# Patient Record
Sex: MALE | Race: BLACK OR AFRICAN AMERICAN | Employment: FULL TIME | URBAN - METROPOLITAN AREA
[De-identification: names, ages, dates, MRNs, and addresses within clinical notes are randomized per-mention and may not be internally consistent; named-entity substitution may affect disease eponyms.]

---

## 2019-09-04 ENCOUNTER — APPOINTMENT (OUTPATIENT)
Dept: GENERAL RADIOLOGY | Age: 34
End: 2019-09-04
Attending: EMERGENCY MEDICINE
Payer: COMMERCIAL

## 2019-09-04 ENCOUNTER — HOSPITAL ENCOUNTER (EMERGENCY)
Age: 34
Discharge: HOME | End: 2019-09-04
Attending: EMERGENCY MEDICINE
Payer: COMMERCIAL

## 2019-09-04 VITALS
WEIGHT: 240 LBS | SYSTOLIC BLOOD PRESSURE: 136 MMHG | BODY MASS INDEX: 30.8 KG/M2 | TEMPERATURE: 97.4 F | DIASTOLIC BLOOD PRESSURE: 63 MMHG | OXYGEN SATURATION: 100 % | HEIGHT: 74 IN | HEART RATE: 94 BPM | RESPIRATION RATE: 16 BRPM

## 2019-09-04 DIAGNOSIS — S96.912A STRAIN OF LEFT FOOT, INITIAL ENCOUNTER: Primary | ICD-10-CM

## 2019-09-04 PROCEDURE — 73630 X-RAY EXAM OF FOOT: CPT

## 2019-09-04 PROCEDURE — 77030036687 HC SHOE PSTOP S2SG -A

## 2019-09-04 PROCEDURE — 99283 EMERGENCY DEPT VISIT LOW MDM: CPT

## 2019-09-04 RX ORDER — IBUPROFEN 800 MG/1
800 TABLET ORAL
Qty: 20 TAB | Refills: 0 | Status: SHIPPED | OUTPATIENT
Start: 2019-09-04 | End: 2019-09-11

## 2019-09-04 NOTE — ED PROVIDER NOTES
EMERGENCY DEPARTMENT HISTORY AND PHYSICAL EXAM    Date: 9/4/2019  Patient Name: Amauri Dutton    History of Presenting Illness     Chief Complaint   Patient presents with    Foot Pain       History Provided By: Patient    Chief Complaint: left foot       Additional History (Context):   5:01 PM  Amauri Dutton is a 34 y.o. male presents to the emergency department C/O left foot pain after he rolled his foot when he tripped over a pallet earlier today. He has been able to ambulate with pain. He has not taken anything for pain. He has no history of injury to the area. No tingling or numbness. PCP: None        Past History     Past Medical History:  History reviewed. No pertinent past medical history. Past Surgical History:  History reviewed. No pertinent surgical history. Family History:  History reviewed. No pertinent family history. Social History:  Social History     Tobacco Use    Smoking status: Never Smoker    Smokeless tobacco: Never Used   Substance Use Topics    Alcohol use: Yes     Alcohol/week: 1.0 standard drinks     Types: 1 Shots of liquor per week    Drug use: Not on file       Allergies:  No Known Allergies    Review of Systems   Review of Systems   Constitutional: Negative for chills and fever. Respiratory: Negative for shortness of breath. Cardiovascular: Negative for chest pain. Musculoskeletal: Positive for arthralgias (left foot ). Skin: Negative for wound. Neurological: Negative for weakness and numbness. All other systems reviewed and are negative. Physical Exam     Vitals:    09/04/19 1646   BP: 136/63   Pulse: 94   Resp: 16   Temp: 97.4 °F (36.3 °C)   SpO2: 100%   Weight: 108.9 kg (240 lb)   Height: 6' 2\" (1.88 m)     Physical Exam   Constitutional: He is oriented to person, place, and time. He appears well-developed and well-nourished. HENT:   Head: Normocephalic and atraumatic.    Cardiovascular: Normal rate, regular rhythm, normal heart sounds and intact distal pulses. No murmur heard. Pulmonary/Chest: Effort normal and breath sounds normal. No respiratory distress. He has no wheezes. He has no rales. Musculoskeletal:        Left ankle: Tenderness. Head of 5th metatarsal tenderness found. No proximal fibula tenderness found. Left foot: There is tenderness. There is normal range of motion, no swelling, normal capillary refill, no crepitus and no deformity. Feet:    Neurological: He is alert and oriented to person, place, and time. Skin: Skin is warm and dry. Psychiatric: He has a normal mood and affect. Judgment normal.   Nursing note and vitals reviewed. Diagnostic Study Results     Labs:   No results found for this or any previous visit (from the past 12 hour(s)). Radiologic Studies:   XR FOOT LT MIN 3 V    (Results Pending)     CT Results  (Last 48 hours)    None        CXR Results  (Last 48 hours)    None          5:18 PM  RADIOLOGY FINDINGS  Left foot X-ray shows NAP  Pending review by Radiologist  Recorded by Clearence Kanner, PA-C    Medical Decision Making   I am the first provider for this patient. I reviewed the vital signs, available nursing notes, past medical history, past surgical history, family history and social history. Vital Signs: Reviewed the patient's vital signs. Pulse Oximetry Analysis: 100% on RA       Records Reviewed: Nursing Notes and Old Medical Records    Procedures:  Procedures    ED Course:   5:01 PM Initial assessment performed. The patients presenting problems have been discussed, and they are in agreement with the care plan formulated and outlined with them. I have encouraged them to ask questions as they arise throughout their visit. Discussion:  Pt presents with left foot pain after injury today. He is neurovascularly intact. X-ray shows no acute process. Will place in postop shoe and give crutches and ibuprofen have patient follow-up with Ortho if not better in a week.  Strict return precautions given, pt offering no questions or complaints. Diagnosis and Disposition     DISCHARGE NOTE:  Rosana Mandujano's  results have been reviewed with him. He has been counseled regarding his diagnosis, treatment, and plan. He verbally conveys understanding and agreement of the signs, symptoms, diagnosis, treatment and prognosis and additionally agrees to follow up as discussed. He also agrees with the care-plan and conveys that all of his questions have been answered. I have also provided discharge instructions for him that include: educational information regarding their diagnosis and treatment, and list of reasons why they would want to return to the ED prior to their follow-up appointment, should his condition change. He has been provided with education for proper emergency department utilization. CLINICAL IMPRESSION:    1. Strain of left foot, initial encounter        PLAN:  1. D/C Home  2. Current Discharge Medication List      START taking these medications    Details   ibuprofen (MOTRIN) 800 mg tablet Take 1 Tab by mouth every six (6) hours as needed for Pain for up to 7 days. Qty: 20 Tab, Refills: 0           3. Follow-up Information     Follow up With Specialties Details Why Contact Info    Larry Hubbard MD Orthopedic Surgery  call for follow up if not better in a week  [address]      THE [facility] EMERGENCY DEPT Emergency Medicine  If symptoms worsen [address] [address] [address] [phone]             Please note that this dictation was completed with Reading Rainbow, the computer voice recognition software. Quite often unanticipated grammatical, syntax, homophones, and other interpretive errors are inadvertently transcribed by the computer software. Please disregard these errors. Please excuse any errors that have escaped final proofreading.

## 2019-09-04 NOTE — LETTER
[facility] [place] 
[facility] 
[address] [address] [phone] Work/School Note Date: 9/4/2019 To Whom It May concern: 
 
Mima Tomas was seen and treated today in the emergency room by the following provider(s): 
Attending Provider: Annmarie Romano DO Physician Assistant: MELE Fernandez. Mima Tomas may return to work on 9/5/2019, limited use of left leg for 1 week allow crutches for weightbearing. Rayo Singer Sincerely, Kym Shone, PA

## 2019-09-11 ENCOUNTER — HOSPITAL ENCOUNTER (EMERGENCY)
Age: 34
Discharge: HOME | End: 2019-09-11
Attending: EMERGENCY MEDICINE
Payer: COMMERCIAL

## 2019-09-11 VITALS
OXYGEN SATURATION: 99 % | WEIGHT: 240 LBS | HEART RATE: 83 BPM | RESPIRATION RATE: 18 BRPM | BODY MASS INDEX: 30.8 KG/M2 | TEMPERATURE: 99.5 F | DIASTOLIC BLOOD PRESSURE: 83 MMHG | HEIGHT: 74 IN | SYSTOLIC BLOOD PRESSURE: 153 MMHG

## 2019-09-11 DIAGNOSIS — Z76.89 RETURN TO WORK EVALUATION: Primary | ICD-10-CM

## 2019-09-11 PROCEDURE — 99282 EMERGENCY DEPT VISIT SF MDM: CPT

## 2019-09-11 NOTE — ED TRIAGE NOTES
Patient ambulate to ED with left ankle pain, patient injured ankle last week and still having pain, patient states he was referred to orthopedic, but did not want to make appointment, patient is here to be cleared to return to work and \"recheck my ankle to make sure it is still in a good position\"

## 2019-09-11 NOTE — LETTER
[facility] [place] 
[facility] 
[address] [address] [phone] Work/School Note Date: 9/11/2019 To Whom It May concern: 
 
Aurelia Mccoy was seen and treated today in the emergency room by the following provider(s): 
Attending Provider: Mckenzie Mae DO Physician Assistant: MELE Irvin. Aurelia Mccoy may return to work on 9/12/19 restrictions, if pain recurs follow-up with Ortho. Sincerely, MELE Villegas

## 2019-09-11 NOTE — ED PROVIDER NOTES
EMERGENCY DEPARTMENT HISTORY AND PHYSICAL EXAM    Date: 9/11/2019  Patient Name: Molina Acuña    History of Presenting Illness     Chief Complaint   Patient presents with    Ankle Pain         History Provided By: Patient    Chief Complaint: left foot pain       Additional History (Context):   6:53 PM  Molina Acuña is a 34 y.o. male presents to the emergency department C/O foot pain that occurred after he rolled his ankle on 9/4. He was seen in the ED and had a normal x-ray. He has been following recommendations with rest ice and elevation and states his foot feels better. He is still having some pain but states he is able to walk on it. He presents today requesting note to return to work. PCP: None    Current Outpatient Medications   Medication Sig Dispense Refill    ibuprofen (MOTRIN) 800 mg tablet Take 1 Tab by mouth every six (6) hours as needed for Pain for up to 7 days. 20 Tab 0       Past History     Past Medical History:  History reviewed. No pertinent past medical history. Past Surgical History:  History reviewed. No pertinent surgical history. Family History:  History reviewed. No pertinent family history. Social History:  Social History     Tobacco Use    Smoking status: Never Smoker    Smokeless tobacco: Never Used   Substance Use Topics    Alcohol use: Yes     Alcohol/week: 1.0 standard drinks     Types: 1 Shots of liquor per week    Drug use: Not on file       Allergies:  No Known Allergies    Review of Systems   Review of Systems   Constitutional: Negative for chills and fever. Respiratory: Negative for shortness of breath. Cardiovascular: Negative for chest pain. Musculoskeletal: Positive for arthralgias (left foot). Neurological: Negative for weakness and numbness. All other systems reviewed and are negative.       Physical Exam     Vitals:    09/11/19 1839   BP: 153/83   Pulse: 83   Resp: 18   Temp: 99.5 °F (37.5 °C)   SpO2: 99%   Weight: 108.9 kg (240 lb)   Height: 6' 2\" (1.88 m)     Physical Exam   Constitutional: He is oriented to person, place, and time. He appears well-developed and well-nourished. Well-appearing, nontoxic distress, ambulatory without difficulty   HENT:   Head: Normocephalic and atraumatic. Cardiovascular: Normal rate, regular rhythm, normal heart sounds and intact distal pulses. No murmur heard. Pulmonary/Chest: Effort normal and breath sounds normal. No respiratory distress. He has no wheezes. He has no rales. Musculoskeletal:        Left foot: There is tenderness. Feet:    Neurological: He is alert and oriented to person, place, and time. Skin: Skin is warm and dry. Psychiatric: He has a normal mood and affect. Judgment normal.   Nursing note and vitals reviewed. Diagnostic Study Results     Labs:   No results found for this or any previous visit (from the past 12 hour(s)). Radiologic Studies:   No orders to display     CT Results  (Last 48 hours)    None        CXR Results  (Last 48 hours)    None          Medical Decision Making   I am the first provider for this patient. I reviewed the vital signs, available nursing notes, past medical history, past surgical history, family history and social history. Vital Signs: Reviewed the patient's vital signs. Pulse Oximetry Analysis: 99% on RA       Records Reviewed: Nursing Notes and Old Medical Records    Procedures:  Procedures    ED Course:   6:53 PM Initial assessment performed. The patients presenting problems have been discussed, and they are in agreement with the care plan formulated and outlined with them. I have encouraged them to ask questions as they arise throughout their visit. Discussion:  Pt presents with left foot pain that occurred 9/4 after he rolled his ankle. X-ray was normal at that time. He has been following instructions and states his foot has improved ever he is still having some pain. Requesting note to return to work.   No additional injury and he has a normal exam. Strict return precautions given, pt offering no questions or complaints. Diagnosis and Disposition     DISCHARGE NOTE:  Rosana Mandujano's  results have been reviewed with him. He has been counseled regarding his diagnosis, treatment, and plan. He verbally conveys understanding and agreement of the signs, symptoms, diagnosis, treatment and prognosis and additionally agrees to follow up as discussed. He also agrees with the care-plan and conveys that all of his questions have been answered. I have also provided discharge instructions for him that include: educational information regarding their diagnosis and treatment, and list of reasons why they would want to return to the ED prior to their follow-up appointment, should his condition change. He has been provided with education for proper emergency department utilization. CLINICAL IMPRESSION:    1. Return to work evaluation        PLAN:  1. D/C Home  2. Current Discharge Medication List        3. Follow-up Information     Follow up With Specialties Details Why Contact Info    Sheryle Costain, MD Orthopedic Surgery  call for follow up and recheck  [place] [address] [address] [place] [address]      THE [facility] EMERGENCY DEPT Emergency Medicine  If symptoms worsen [address] [place] [id number] [phone]                 Please note that this dictation was completed with Waynaut, the computer voice recognition software. Quite often unanticipated grammatical, syntax, homophones, and other interpretive errors are inadvertently transcribed by the computer software. Please disregard these errors. Please excuse any errors that have escaped final proofreading.

## 2019-09-11 NOTE — LETTER
[facility] [place] 
THE [facility] EMERGENCY DEPT 
[address] [address] [phone] Work/School Note Date: 9/11/2019 To Whom It May concern: 
 
Nitza Mary was seen and treated today in the emergency room by the following provider(s): 
Physician Assistant: Charles Santos. Nitza Mary may return to work on 9/12/2019, no restrictions. If pain reoccurs, follow up with orthopedic. Sincerely, Authur Ahumada, PA

## 2020-03-10 ENCOUNTER — HOSPITAL ENCOUNTER (EMERGENCY)
Age: 35
Discharge: HOME | End: 2020-03-11
Attending: EMERGENCY MEDICINE
Payer: COMMERCIAL

## 2020-03-10 VITALS
OXYGEN SATURATION: 100 % | HEART RATE: 79 BPM | HEIGHT: 73 IN | TEMPERATURE: 97.4 F | BODY MASS INDEX: 33.13 KG/M2 | WEIGHT: 250 LBS | RESPIRATION RATE: 16 BRPM | DIASTOLIC BLOOD PRESSURE: 64 MMHG | SYSTOLIC BLOOD PRESSURE: 123 MMHG

## 2020-03-10 DIAGNOSIS — R68.89 FLU-LIKE SYMPTOMS: Primary | ICD-10-CM

## 2020-03-10 DIAGNOSIS — Z72.0 TOBACCO USE: ICD-10-CM

## 2020-03-10 LAB
FLUAV AG NPH QL IA: NEGATIVE
FLUBV AG NOSE QL IA: NEGATIVE

## 2020-03-10 PROCEDURE — 87804 INFLUENZA ASSAY W/OPTIC: CPT

## 2020-03-10 PROCEDURE — 99281 EMR DPT VST MAYX REQ PHY/QHP: CPT

## 2020-03-10 NOTE — LETTER
NOTIFICATION RETURN TO WORK / SCHOOL 
 
3/11/2020 12:53 AM 
 
Mr. Kinza Jose [address] [facility] [address] To Whom It May Concern: 
 
Kinza Jose is currently under the care of THE [facility] EMERGENCY DEPT. He will return to work/school once WITHOUT fever of flu like symptoms If there are questions or concerns please have the patient contact our office.  
 
 
 
Sincerely, 
 
 
 
 
Bryan Harvey MD

## 2020-03-11 RX ORDER — CEPHALEXIN 250 MG/1
250 CAPSULE ORAL 4 TIMES DAILY
Qty: 28 CAP | Refills: 0 | Status: SHIPPED | OUTPATIENT
Start: 2020-03-11 | End: 2020-03-18

## 2020-03-11 RX ORDER — IBUPROFEN 600 MG/1
600 TABLET ORAL
Qty: 20 TAB | Refills: 0 | Status: SHIPPED | OUTPATIENT
Start: 2020-03-11

## 2020-03-11 RX ORDER — ACETAMINOPHEN 325 MG/1
650 TABLET ORAL
Qty: 20 TAB | Refills: 0 | Status: SHIPPED | OUTPATIENT
Start: 2020-03-11

## 2020-03-11 NOTE — DISCHARGE INSTRUCTIONS

## 2020-03-11 NOTE — ED NOTES
I have reviewed discharge instructions with the patient. The patient verbalized understanding.  In NAD at this time

## 2020-03-11 NOTE — ED PROVIDER NOTES
EMERGENCY DEPARTMENT HISTORY AND PHYSICAL EXAM    Date: 3/10/2020  Patient Name: Jesse Quintana    History of Presenting Illness     Chief Complaint   Patient presents with    Cough    Nausea         History Provided By: Patient    Additional History (Context):   12:38 AM  Jesse Quintana is a 30 y.o. male with no PMHX who presents to the emergency department C/O wet productive cough, with watery phlegm, x 2-3 days. Pt has associated sxs of N/V/D (last two days) and generalized body aches. Pt denies fever, abdominal pain, SOB, chills, or recent sick contact. Pt currently works in construction. Pt denies any history of HTN or DM. Social History  Pt uses tobacco (stopped over the last few days while he was sick). Social ETOH use. No illicit drug use. Family History  No pertinent history     PCP: None        Past History     Past Medical History:  History reviewed. No pertinent past medical history. Past Surgical History:  History reviewed. No pertinent surgical history. Family History:  History reviewed. No pertinent family history. Social History:  Social History     Tobacco Use    Smoking status: Never Smoker    Smokeless tobacco: Never Used   Substance Use Topics    Alcohol use: Yes     Alcohol/week: 1.0 standard drinks     Types: 1 Shots of liquor per week    Drug use: Not on file       Allergies:  No Known Allergies      Review of Systems   Review of Systems   Constitutional: Negative for chills and fever. Respiratory: Positive for cough (Wet; clear productive). Negative for shortness of breath. Gastrointestinal: Positive for diarrhea, nausea and vomiting. Negative for abdominal pain. Musculoskeletal: Positive for myalgias (generalized ). All other systems reviewed and are negative.         Physical Exam     Vitals:    03/10/20 2311   BP: 123/64   Pulse: 79   Resp: 16   Temp: 97.4 °F (36.3 °C)   SpO2: 100%   Weight: 113.4 kg (250 lb)   Height: 6' 1\" (1.854 m)     Physical Exam  Vitals signs and nursing note reviewed. Constitutional:       General: He is not in acute distress. Appearance: He is well-developed. He is not diaphoretic. HENT:      Head: Normocephalic and atraumatic. Comments: Redness w/o Tonsillar hypertrophy or exudate. Right Ear: External ear normal.      Left Ear: External ear normal.      Mouth/Throat:      Pharynx: No oropharyngeal exudate. Eyes:      General: No scleral icterus. Conjunctiva/sclera: Conjunctivae normal.      Pupils: Pupils are equal, round, and reactive to light. Neck:      Musculoskeletal: Normal range of motion and neck supple. Thyroid: No thyromegaly. Vascular: No JVD. Trachea: No tracheal deviation. Cardiovascular:      Rate and Rhythm: Normal rate and regular rhythm. Heart sounds: Normal heart sounds. Pulmonary:      Effort: Pulmonary effort is normal. No respiratory distress. Breath sounds: Normal breath sounds. No stridor. Abdominal:      General: Bowel sounds are normal. There is no distension. Palpations: Abdomen is soft. Tenderness: There is no abdominal tenderness. There is no guarding or rebound. Musculoskeletal: Normal range of motion. General: No tenderness. Lymphadenopathy:      Cervical: No cervical adenopathy. Skin:     General: Skin is warm and dry. Findings: No erythema or rash. Neurological:      Mental Status: He is alert and oriented to person, place, and time. Cranial Nerves: No cranial nerve deficit. Coordination: Coordination normal.      Deep Tendon Reflexes: Reflexes are normal and symmetric. Reflexes normal.   Psychiatric:         Behavior: Behavior normal.         Thought Content:  Thought content normal.         Judgment: Judgment normal.             Diagnostic Study Results     Labs -     Recent Results (from the past 12 hour(s))   INFLUENZA A & B AG (RAPID TEST)    Collection Time: 03/10/20 11:05 PM   Result Value Ref Range    Influenza A Antigen NEGATIVE  NEG      Influenza B Antigen NEGATIVE  NEG           Medications given in the ED-  Medications - No data to display      Medical Decision Making   I am the first provider for this patient. I reviewed the vital signs, available nursing notes, past medical history, past surgical history, family history and social history. Vital Signs-Reviewed the patient's vital signs. Pulse Oximetry Analysis - 100% on RA       Records Reviewed: NURSING NOTES AND PREVIOUS MEDICAL RECORDS    Provider Notes (Medical Decision Making):   Pt has sxs of bronchitis or viral illness with smoking history. Pt needs work note, supported medications, and added antibiotics due to smoking history and vague purulence of sputum. Procedures:  Procedures    ED Course:   12:38 AM: Initial assessment performed. The patients presenting problems have been discussed, and they are in agreement with the care plan formulated and outlined with them. I have encouraged them to ask questions as they arise throughout their visit. Diagnosis and Disposition       DISCHARGE NOTE:  12:52 AM    Rosana Mandujano's  results have been reviewed with him. He has been counseled regarding his diagnosis, treatment, and plan. He verbally conveys understanding and agreement of the signs, symptoms, diagnosis, treatment and prognosis and additionally agrees to follow up as discussed. He also agrees with the care-plan and conveys that all of his questions have been answered. I have also provided discharge instructions for him that include: educational information regarding their diagnosis and treatment, and list of reasons why they would want to return to the ED prior to their follow-up appointment, should his condition change. He has been provided with education for proper emergency department utilization. CLINICAL IMPRESSION:  1. Flu-like symptoms    2. Tobacco use        PLAN:  1. D/C Home  2.    Discharge Medication List as of 3/11/2020 12:53 AM      START taking these medications    Details   cephALEXin (KEFLEX) 250 mg capsule Take 1 Cap by mouth four (4) times daily for 7 days. , Normal, Disp-28 Cap, R-0      guaiFENesin-dextromethorphan SR (MUCINEX DM) 600-30 mg per tablet Take 1 Tab by mouth two (2) times a day., Normal, Disp-30 Tab, R-0      acetaminophen (TYLENOL) 325 mg tablet Take 2 Tabs by mouth every four (4) hours as needed for Pain., Normal, Disp-20 Tab, R-0      ibuprofen (MOTRIN) 600 mg tablet Take 1 Tab by mouth every six (6) hours as needed for Pain., Normal, Disp-20 Tab, R-0           3. Follow-up Information     Follow up With Specialties Details Why Contact Info    [address] [address] [place]  Schedule an appointment as soon as possible for a visit in 2 days Follow up with PCP [address] [place], [address] [facility] [address] [facility] [address]    [facility] Emergency Medicine Go to As needed, If symptoms worsen [address] [id number]  [phone]        _______________________________    This note was partially transcribed via voice recognition software. Although efforts have been made to catch any discrepancies, it may contain sound alike words, grammatical errors, or nonsensical words. Attestations: This note is prepared by KATHERINE , acting as Scribe for Magi Richter. Jing Fuller MD .    Magi Richter. Jing Fuller MD :  The scribe's documentation has been prepared under my direction and personally reviewed by me in its entirety. I confirm that the note above accurately reflects all work, treatment, procedures, and medical decision making performed by me.

## 2021-10-01 ENCOUNTER — HOSPITAL ENCOUNTER (EMERGENCY)
Age: 36
Discharge: LEFT BEFORE DISCHARGE | End: 2021-10-01

## 2021-10-01 VITALS
BODY MASS INDEX: 30.8 KG/M2 | HEIGHT: 74 IN | OXYGEN SATURATION: 99 % | WEIGHT: 240 LBS | DIASTOLIC BLOOD PRESSURE: 72 MMHG | RESPIRATION RATE: 16 BRPM | TEMPERATURE: 98.7 F | SYSTOLIC BLOOD PRESSURE: 143 MMHG | HEART RATE: 99 BPM

## 2021-10-01 PROCEDURE — 75810000275 HC EMERGENCY DEPT VISIT NO LEVEL OF CARE
